# Patient Record
Sex: FEMALE | Race: BLACK OR AFRICAN AMERICAN | NOT HISPANIC OR LATINO | Employment: STUDENT | URBAN - METROPOLITAN AREA
[De-identification: names, ages, dates, MRNs, and addresses within clinical notes are randomized per-mention and may not be internally consistent; named-entity substitution may affect disease eponyms.]

---

## 2017-02-11 ENCOUNTER — HOSPITAL ENCOUNTER (EMERGENCY)
Facility: OTHER | Age: 22
Discharge: HOME | End: 2017-02-11
Attending: EMERGENCY MEDICINE
Payer: COMMERCIAL

## 2017-02-11 VITALS
OXYGEN SATURATION: 100 % | TEMPERATURE: 99 F | DIASTOLIC BLOOD PRESSURE: 59 MMHG | HEART RATE: 80 BPM | BODY MASS INDEX: 28.93 KG/M2 | WEIGHT: 180 LBS | HEIGHT: 66 IN | RESPIRATION RATE: 16 BRPM | SYSTOLIC BLOOD PRESSURE: 109 MMHG

## 2017-02-11 DIAGNOSIS — F07.81 POST CONCUSSION SYNDROME: Primary | ICD-10-CM

## 2017-02-11 PROCEDURE — 99283 EMERGENCY DEPT VISIT LOW MDM: CPT

## 2017-02-11 RX ORDER — ONDANSETRON 4 MG/1
4 TABLET, ORALLY DISINTEGRATING ORAL EVERY 8 HOURS PRN
Qty: 20 TABLET | Refills: 0 | Status: SHIPPED | OUTPATIENT
Start: 2017-02-11

## 2017-02-11 RX ORDER — BUTALBITAL, ACETAMINOPHEN AND CAFFEINE 50; 325; 40 MG/1; MG/1; MG/1
1 TABLET ORAL
Status: COMPLETED | OUTPATIENT
Start: 2017-02-11 | End: 2017-02-11

## 2017-02-11 RX ORDER — ONDANSETRON 4 MG/1
4 TABLET, ORALLY DISINTEGRATING ORAL EVERY 8 HOURS PRN
Qty: 20 TABLET | Refills: 0 | Status: SHIPPED | OUTPATIENT
Start: 2017-02-11 | End: 2017-02-11

## 2017-02-11 RX ORDER — ESCITALOPRAM OXALATE 10 MG/1
10 TABLET ORAL DAILY
COMMUNITY

## 2017-02-11 RX ORDER — ONDANSETRON 4 MG/1
4 TABLET, ORALLY DISINTEGRATING ORAL
Status: COMPLETED | OUTPATIENT
Start: 2017-02-11 | End: 2017-02-11

## 2017-02-11 RX ADMIN — ONDANSETRON 4 MG: 4 TABLET, ORALLY DISINTEGRATING ORAL at 07:02

## 2017-02-11 RX ADMIN — BUTALBITAL, ACETAMINOPHEN AND CAFFEINE 1 TABLET: 50; 325; 40 TABLET ORAL at 07:02

## 2017-02-11 NOTE — ED AVS SNAPSHOT
[facility]  [address]  [facility] [address]               Maureen Worthington   2017  5:47 PM   ED    Description:  Female : 1995   Department:  [facility]           Your Care was Coordinated By:     Provider Role From To    Yvonne Real MD Attending Provider [phone] --    Deysi Degroot PA-C Physician Assistant [phone] --      Reason for Visit     Fall           Diagnoses this Visit        Comments    Post concussion syndrome    -  Primary       ED Disposition     None           To Do List           Follow-up Information     Follow up with PCP In 2 days.       These Medications        Disp Refills Start End    ondansetron (ZOFRAN-ODT) 4 MG TbDL 20 tablet 0 2017     Take 1 tablet (4 mg total) by mouth every 8 (eight) hours as needed. - Oral      [facility]     [facility] -  Assistance  Registered nurses in the [facility] provide clinical advisement, health education, appointment booking, and other advisory services.  Call for this free service at [phone].             Medications           Message regarding Medications     Verify the changes and/or additions to your medication regime listed below are the same as discussed with your clinician today.  If any of these changes or additions are incorrect, please notify your healthcare provider.        START taking these NEW medications        Refills    ondansetron (ZOFRAN-ODT) 4 MG TbDL 0    Sig: Take 1 tablet (4 mg total) by mouth every 8 (eight) hours as needed.    Class: Print    Route: Oral      These medications were administered today        Dose Freq    butalbital-acetaminophen-caffeine -40 mg per tablet 1 tablet 1 tablet ED 1 Time    Sig: Take 1 tablet by mouth ED 1 Time.    Class: Normal    Route: Oral    ondansetron disintegrating tablet 4 mg 4 mg ED 1 Time    Sig: Take 1 tablet (4 mg total) by  "mouth ED 1 Time.    Class: Normal    Route: Oral           Verify that the below list of medications is an accurate representation of the medications you are currently taking.  If none reported, the list may be blank. If incorrect, please contact your healthcare provider. Carry this list with you in case of emergency.           Current Medications     escitalopram oxalate (LEXAPRO) 10 MG tablet Take 10 mg by mouth once daily.    butalbital-acetaminophen-caffeine -40 mg per tablet 1 tablet Take 1 tablet by mouth ED 1 Time.    ondansetron (ZOFRAN-ODT) 4 MG TbDL Take 1 tablet (4 mg total) by mouth every 8 (eight) hours as needed.    ondansetron disintegrating tablet 4 mg Take 1 tablet (4 mg total) by mouth ED 1 Time.           Clinical Reference Information           Your Vitals Were     BP Pulse Temp Resp Height Weight    112/65 (BP Location: Left arm, Patient Position: Sitting) 70 98.7 °F (37.1 °C) (Oral) 12 5' 6" (1.676 m) 81.6 kg (180 lb)    SpO2 BMI             100% 29.05 kg/m2         Allergies as of 2/11/2017     No Known Allergies      Immunizations Administered on Date of Encounter - 2/11/2017     None      ED Micro, Lab, POCT     Start Ordered       Status Ordering Provider    02/11/17 1729 02/11/17 1728  POCT urine pregnancy  Once      Acknowledged       ED Imaging Orders     None      Discharge References/Attachments     CONCUSSION, DISCHARGE INSTRUCTIONS FOR (ENGLISH)      [facility] Sign-Up     Activating your [facility] account is as easy as 1-2-3!     1) Visit [email or web address], select Sign Up Now, enter this activation code and your date of birth, then select Next.  [email or web address]  Expires: 3/28/2017  7:14 PM      2) Create a username and password to use when you visit [facility] in the future and select a security question in case you lose your password and select Next.    3) Enter your e-mail address and click Sign Up!    Additional Information  If you have questions, please e-mail " [email or web address] or call [phone] to talk to our [facility] staff. Remember, [facility] is NOT to be used for urgent needs. For medical emergencies, dial 911.          [facility] complies with applicable Federal civil rights laws and does not discriminate on the basis of race, color, national origin, age, disability, or sex.        Language Assistance Services     ATTENTION: Language assistance services are available, free of charge. Please call [phone].      ATENCIÓN: Si habla español, tiene a valencia disposición servicios gratuitos de asistencia lingüística. Llame al [phone].     CHÚ Ý: N?u b?n nói Ti?ng Vi?t, có các d?ch v? h? tr? ngôn ng? mi?n phí dành cho b?n. G?i s? [phone].

## 2017-02-12 NOTE — ED NOTES
Pt to er with c/o headache in forehead photophobia, and confusion. Pt states fell and hit back of  Head on concrete floor week ago. Pt  Denies LOC but with nausea no vomiting now.  LOC: The patient is awake, alert and aware of environment with an appropriate affect, the patient is oriented x 3 and speaking appropriately.  APPEARANCE: Patient resting comfortably and in no acute distress, patient is clean and well groomed, patient's clothing is properly fastened.  SKIN: The skin is warm and dry, patient has normal skin turgor and moist mucus membranes, skin intact, no breakdown or brusing noted.  MUSKULOSKELETAL: Patient moving all extremities well, no obvious swelling or deformities noted.  RESPIRATORY: Airway is open and patent, respirations are spontaneous, patient has a normal effort and rate. Breath sounds are clear and equal bilaterally.  CARDIAC: Normal heart sounds. No peripheral edema.  ABDOMEN: Soft and non tender to palpation, no distention noted. Bowel sounds present.  NEURO: No neuro deficits, hand grasp equal, no drift noted, no facial droop noted. Speech is clear.

## 2017-02-12 NOTE — ED PROVIDER NOTES
Encounter Date: 2/11/2017       History     Chief Complaint   Patient presents with    Fall     Patient had a slip and fall last week and hit her head on concrete last week.  Patient denies LOC.  Patient stated she has been having a headache and nausea since.  Patient denies dizziness or blurred vision     Review of patient's allergies indicates:  No Known Allergies  HPI Comments: Pt is a 21 yr old female with no significant medical history who presents to the ED with headache.  Pt states last Saturday she was walking on the sidewalk, when she fell falling back hitting the back of her head.  Pt states she did not lose consciousness.  Pt states since the fall, she has been having headaches that come and go.  Pt also reports nausea.  She denies any episodes of vomiting.  She denies visual disturbance.  She denies altered mental status.  She rates her pain 5/10.  She states she is taking naproxen with no relief of her symptoms.    The history is provided by the patient.     History reviewed. No pertinent past medical history.  No past medical history pertinent negatives.  Past Surgical History   Procedure Laterality Date    Joint replacement       History reviewed. No pertinent family history.  Social History   Substance Use Topics    Smoking status: Never Smoker    Smokeless tobacco: None    Alcohol use Yes      Comment: socially     Review of Systems   Constitutional: Negative for activity change, appetite change, chills, fatigue and fever.   HENT: Negative for congestion, ear discharge, ear pain, hearing loss, mouth sores, nosebleeds, postnasal drip, sore throat and trouble swallowing.    Eyes: Negative for photophobia and visual disturbance.   Respiratory: Negative for cough and shortness of breath.    Cardiovascular: Negative for chest pain.   Gastrointestinal: Positive for nausea. Negative for abdominal pain, blood in stool, constipation, diarrhea and vomiting.   Genitourinary: Negative for dysuria, flank  pain and hematuria.   Musculoskeletal: Negative for back pain, myalgias, neck pain and neck stiffness.   Skin: Negative for rash and wound.   Neurological: Positive for headaches. Negative for dizziness, facial asymmetry, speech difficulty, weakness, light-headedness and numbness.       Physical Exam   Initial Vitals   BP Pulse Resp Temp SpO2   02/11/17 1726 02/11/17 1726 02/11/17 1726 02/11/17 1726 02/11/17 1726   112/65 70 12 98.7 °F (37.1 °C) 100 %     Physical Exam    Nursing note and vitals reviewed.  Constitutional: She appears well-developed and well-nourished. She is not diaphoretic.  Non-toxic appearance. No distress.   HENT:   Head: Normocephalic. Head is without raccoon's eyes and without Last's sign.   Right Ear: Hearing, tympanic membrane, external ear and ear canal normal. No hemotympanum.   Left Ear: Hearing, tympanic membrane, external ear and ear canal normal. No hemotympanum.   Nose: Nose normal.   Mouth/Throat: Uvula is midline and oropharynx is clear and moist. No trismus in the jaw. No uvula swelling. No oropharyngeal exudate.   Eyes: Conjunctivae and EOM are normal. Pupils are equal, round, and reactive to light.   Neck: Normal range of motion and full passive range of motion without pain. Neck supple. No spinous process tenderness and no muscular tenderness present. Normal range of motion present. No rigidity.   Cardiovascular: Normal rate and regular rhythm.   Pulmonary/Chest: Breath sounds normal. No respiratory distress. She has no wheezes. She has no rhonchi. She has no rales. She exhibits no tenderness.   Abdominal: Soft. Bowel sounds are normal. She exhibits no distension and no mass. There is no tenderness. There is no rebound and no guarding.   Lymphadenopathy:     She has no cervical adenopathy.   Neurological: She is alert and oriented to person, place, and time. She has normal strength. No cranial nerve deficit or sensory deficit. She displays a negative Romberg sign. Coordination  and gait normal.   Skin: Skin is warm and dry.   Psychiatric: She has a normal mood and affect.         ED Course   Procedures  Labs Reviewed   POCT URINE PREGNANCY             Medical Decision Making:   Initial Assessment:   Urgent evaluation of a 21 yr old female with no significant medical history who presents to the ED with complaints of headache after a fall.  Pt is afebrile, nontoxic appearing, and hemodynamically stable.  Fall occurred one week ago.  Pt did not lose consciousness during injury.  She had no AMS or episodes of vomiting.  She is complaining of lingering headache and nausea.  Neuro exam is unremarkable.  According to [place] Head CT scan rule, I do not feel that head CT is warranted.  I suspect pt is experiencing post concussion syndrome.  I did offer CT to family, but they decline.  She is given head injury precautions. She is given dose of fioricet here and zofran.  She is advised to follow up with PCP or return to ED with any worsening symptoms or concerns.  Other:   I have discussed this case with another health care provider.       <> Summary of the Discussion: Dr. Real                   ED Course     Clinical Impression:   The encounter diagnosis was Post concussion syndrome.          Deysi Degroot PA-C  02/11/17 1938

## 2019-01-22 ENCOUNTER — NURSE TRIAGE (OUTPATIENT)
Dept: ADMINISTRATIVE | Facility: CLINIC | Age: 24
End: 2019-01-22
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
